# Patient Record
Sex: FEMALE | Race: ASIAN | Employment: OTHER | URBAN - METROPOLITAN AREA
[De-identification: names, ages, dates, MRNs, and addresses within clinical notes are randomized per-mention and may not be internally consistent; named-entity substitution may affect disease eponyms.]

---

## 2024-03-14 ENCOUNTER — OFFICE VISIT (OUTPATIENT)
Dept: OBGYN CLINIC | Facility: CLINIC | Age: 34
End: 2024-03-14
Payer: COMMERCIAL

## 2024-03-14 VITALS
BODY MASS INDEX: 22.39 KG/M2 | DIASTOLIC BLOOD PRESSURE: 60 MMHG | WEIGHT: 118.6 LBS | SYSTOLIC BLOOD PRESSURE: 100 MMHG | HEIGHT: 61 IN

## 2024-03-14 DIAGNOSIS — N92.1 BREAKTHROUGH BLEEDING ON OCPS: ICD-10-CM

## 2024-03-14 DIAGNOSIS — Z01.419 ENCOUNTER FOR GYNECOLOGICAL EXAMINATION WITHOUT ABNORMAL FINDING: Primary | ICD-10-CM

## 2024-03-14 PROCEDURE — G0145 SCR C/V CYTO,THINLAYER,RESCR: HCPCS | Performed by: OBSTETRICS & GYNECOLOGY

## 2024-03-14 PROCEDURE — S0610 ANNUAL GYNECOLOGICAL EXAMINA: HCPCS | Performed by: OBSTETRICS & GYNECOLOGY

## 2024-03-14 PROCEDURE — G0476 HPV COMBO ASSAY CA SCREEN: HCPCS | Performed by: OBSTETRICS & GYNECOLOGY

## 2024-03-14 RX ORDER — NORGESTIMATE AND ETHINYL ESTRADIOL 0.25-0.035
1 KIT ORAL DAILY
Qty: 28 TABLET | Refills: 3 | Status: SHIPPED | OUTPATIENT
Start: 2024-03-14

## 2024-03-14 NOTE — PROGRESS NOTES
"Subjective      Abby Hansen is a 33 y.o. female who presents for annual well woman exam. Periods are irregular, lasting 10 days. No intermenstrual bleeding, spotting, or discharge.  When she was on OCP was c/o constant spotting   Current contraception: OCP (estrogen/progesterone)  History of abnormal Pap smear: no  Family history of uterine or ovarian cancer: no  Family history of breast cancer: no    Menstrual History:  OB History          0    Para   0    Term   0       0    AB   0    Living   0         SAB   0    IAB   0    Ectopic   0    Multiple   0    Live Births   0                  Patient's last menstrual period was 2024 (exact date).  Period Pattern: (!) Irregular (Pt states she was having bleeding with bc pills.)  Menstrual Flow: Light  Dysmenorrhea: (!) Severe  Dysmenorrhea Symptoms: Other (Comment) (back pain.)    The following portions of the patient's history were reviewed and updated as appropriate: allergies, current medications, past family history, past medical history, past social history, past surgical history, and problem list.    Review of Systems  Review of Systems   Constitutional:  Negative for activity change, appetite change, chills, fatigue and fever.   Respiratory:  Negative for apnea, cough, chest tightness and shortness of breath.    Cardiovascular:  Negative for chest pain, palpitations and leg swelling.   Gastrointestinal:  Negative for abdominal pain, constipation, diarrhea, nausea and vomiting.   Genitourinary:  Negative for difficulty urinating, dysuria, flank pain, frequency, hematuria and urgency.   Neurological:  Negative for dizziness, seizures, syncope, light-headedness, numbness and headaches.   Psychiatric/Behavioral:  Negative for agitation and confusion.           Objective      /60 (BP Location: Left arm, Patient Position: Sitting, Cuff Size: Adult)   Ht 5' 1\" (1.549 m)   Wt 53.8 kg (118 lb 9.6 oz)   LMP 2024 (Exact Date)   BMI 22.41 " kg/m²     Physical Exam  OBGyn Exam     General:   alert and oriented, in no acute distress, alert, appears stated age, and cooperative   Heart: regular rate and rhythm, S1, S2 normal, no murmur, click, rub or gallop   Lungs: clear to auscultation bilaterally   Abdomen: soft, non-tender, without masses or organomegaly   Vulva: normal   Vagina: normal mucosa, normal discharge   Cervix: no cervical motion tenderness and no lesions   Uterus: normal size   Adnexa:  Breast Exam:  normal adnexa  breasts appear normal, no suspicious masses, no skin or nipple changes or axillary nodes.                Assessment      @well woman@ .  33-year-old female  Annual exam  Irregular menses  Was having breakthrough bleeding on OCP  Desire to try different type of OCP  Plan   Pap/HPV  Diet/exercise  Calcium/vitamin D  Pelvic ultrasound evaluate for any abnormality  Return to office in 3 months follow-up on OCP bleeding and discussed ultrasound result   All questions answered.     There are no Patient Instructions on file for this visit.

## 2024-03-15 LAB
HPV HR 12 DNA CVX QL NAA+PROBE: NEGATIVE
HPV16 DNA CVX QL NAA+PROBE: NEGATIVE
HPV18 DNA CVX QL NAA+PROBE: NEGATIVE

## 2024-03-22 ENCOUNTER — HOSPITAL ENCOUNTER (OUTPATIENT)
Dept: ULTRASOUND IMAGING | Facility: HOSPITAL | Age: 34
End: 2024-03-22
Attending: OBSTETRICS & GYNECOLOGY
Payer: COMMERCIAL

## 2024-03-22 DIAGNOSIS — N92.1 BREAKTHROUGH BLEEDING ON OCPS: ICD-10-CM

## 2024-03-22 LAB
LAB AP GYN PRIMARY INTERPRETATION: NORMAL
Lab: NORMAL

## 2024-03-22 PROCEDURE — 76856 US EXAM PELVIC COMPLETE: CPT

## 2024-03-22 PROCEDURE — 76830 TRANSVAGINAL US NON-OB: CPT

## 2024-05-22 ENCOUNTER — TELEPHONE (OUTPATIENT)
Dept: OBGYN CLINIC | Facility: CLINIC | Age: 34
End: 2024-05-22

## 2024-05-22 NOTE — TELEPHONE ENCOUNTER
Pt states she was told to start OCP when her period comes, However she has not had her period. Pt states she went to her PCP and had pregnancy test done and it was negative. Pt would like to know what do you recommend. Pt would for us to contact her mother in law Lacey which is on her chart as her emergency contact.

## 2024-05-22 NOTE — TELEPHONE ENCOUNTER
Please let the patient do urine pregnancy test if negative she can start taking the birth control pills every day at the same time she can do quick start she may have breakthrough bleeding for the first month recommend to monitor the bleeding for the next couple of months after the first month and to keep her appointment in 3 months for follow-up

## 2024-05-22 NOTE — TELEPHONE ENCOUNTER
Left detailed message for patient per communication consent reviewing directions per Dr. Joseph. Offered call back with questions or concerns.

## 2024-06-05 ENCOUNTER — NURSE TRIAGE (OUTPATIENT)
Age: 34
End: 2024-06-05

## 2024-06-19 ENCOUNTER — OFFICE VISIT (OUTPATIENT)
Dept: OBGYN CLINIC | Facility: CLINIC | Age: 34
End: 2024-06-19
Payer: COMMERCIAL

## 2024-06-19 VITALS
BODY MASS INDEX: 21.98 KG/M2 | DIASTOLIC BLOOD PRESSURE: 62 MMHG | SYSTOLIC BLOOD PRESSURE: 100 MMHG | HEIGHT: 61 IN | WEIGHT: 116.4 LBS

## 2024-06-19 DIAGNOSIS — N93.9 ABNORMAL UTERINE BLEEDING: Primary | ICD-10-CM

## 2024-06-19 PROCEDURE — 99213 OFFICE O/P EST LOW 20 MIN: CPT | Performed by: OBSTETRICS & GYNECOLOGY

## 2024-06-19 RX ORDER — MULTIVITAMIN
1 CAPSULE ORAL DAILY
COMMUNITY

## 2024-06-19 NOTE — PROGRESS NOTES
"Assessment/Plan:     There are no diagnoses linked to this encounter.      33-year-old female  Irregular menses  Was having breakthrough bleeding on OCP  Labs suggest unovulation  Started higher dose OCP 2 weeks ago  Plan   Pelvic ultrasound result reviewed discussed with patient  Lab result reviewed and discussed with patient  Continue OCP for 3 months  Monitor bleeding and symptoms for the next 3 months  Return to office in 3 months for follow-up        Subjective:      Patient ID: Abby Hansen is a 34 y.o. female.    HPI  Patient seen evaluated present to the office today with her partner follow-up on abnormal uterine bleeding  Patient was having breakthrough bleeding with low-dose OCP patient switch to higher dose OCP patient went for lab work and ultrasound both result reviewed and discussed with patient  Patient started higher dose OCP Sprintec 2 weeks ago  Patient did not have withdrawal bleeding when she stopped prior OCP  Lab results suggest un ovulation status secondary to LH> FSH  Finding reviewed and discussed with patient recommendation to continue current OCP for 3 months monitor bleeding and symptom and to return to office in 3 months   Denies any nausea vomiting  Denies any bloating  Denies any breast tenderness  Denies any side effects on OCP  The following portions of the patient's history were reviewed and updated as appropriate: allergies, current medications, past family history, past medical history, past social history, past surgical history and problem list.    Review of Systems      Objective:      /62 (BP Location: Left arm, Patient Position: Sitting, Cuff Size: Adult)   Ht 5' 1\" (1.549 m)   Wt 52.8 kg (116 lb 6.4 oz)   BMI 21.99 kg/m²          Physical Exam  Constitutional:       Appearance: Normal appearance.   Neurological:      General: No focal deficit present.      Mental Status: She is alert and oriented to person, place, and time.   Psychiatric:         Mood and Affect: Mood " normal.         Behavior: Behavior normal.

## 2024-09-21 DIAGNOSIS — N92.1 BREAKTHROUGH BLEEDING ON OCPS: ICD-10-CM

## 2024-09-23 RX ORDER — NORGESTIMATE AND ETHINYL ESTRADIOL 0.25-0.035
1 KIT ORAL DAILY
Qty: 28 TABLET | Refills: 3 | Status: SHIPPED | OUTPATIENT
Start: 2024-09-23

## 2024-11-21 ENCOUNTER — OFFICE VISIT (OUTPATIENT)
Dept: OBGYN CLINIC | Facility: CLINIC | Age: 34
End: 2024-11-21
Payer: COMMERCIAL

## 2024-11-21 VITALS
WEIGHT: 120.2 LBS | HEIGHT: 61 IN | DIASTOLIC BLOOD PRESSURE: 64 MMHG | BODY MASS INDEX: 22.69 KG/M2 | SYSTOLIC BLOOD PRESSURE: 102 MMHG

## 2024-11-21 DIAGNOSIS — N92.1 BREAKTHROUGH BLEEDING ON OCPS: ICD-10-CM

## 2024-11-21 PROCEDURE — 99213 OFFICE O/P EST LOW 20 MIN: CPT | Performed by: OBSTETRICS & GYNECOLOGY

## 2024-11-21 RX ORDER — NIRMATRELVIR AND RITONAVIR 300-100 MG
KIT ORAL
COMMUNITY
Start: 2024-09-02

## 2024-11-21 RX ORDER — NORGESTIMATE AND ETHINYL ESTRADIOL 0.25-0.035
1 KIT ORAL DAILY
Qty: 28 TABLET | Refills: 5 | Status: SHIPPED | OUTPATIENT
Start: 2024-11-21

## 2024-11-22 NOTE — PROGRESS NOTES
"Assessment/Plan:     Diagnoses and all orders for this visit:    Breakthrough bleeding on OCPs  -     norgestimate-ethinyl estradiol (Estarylla) 0.25-35 MG-MCG per tablet; Take 1 tablet by mouth daily    Other orders  -     Paxlovid, 300/100, tablet therapy pack;  (Patient not taking: Reported on 11/21/2024)        34-year-old female  Irregular menses  Was having breakthrough bleeding on OCP  Labs suggest unovulation  Started higher dose OCP desires to continue  Plan   Continue OCP risk benefit side effect explain   All questions answered and patient was satisfied       Subjective:      Patient ID: Abby Hansen is a 34 y.o. female.    HPI  33 yo female here today for follow up on OCP   Patient said her bleeding improved on OCP denies any breakthrough bleeding  Having regular cycle menstrual chart reviewed and discussed with patient  Risk-benefit side effect of OCP reviewed and discussed with patient  Denies any side effect  Denies any breakthrough bleeding  Denies any breast tenderness  Denies any weight changes  Denies any mood changes  Patient desired to continue risk-benefit side effect again discussed with patient all patient questions answered and patient was satisfied        The following portions of the patient's history were reviewed and updated as appropriate: allergies, current medications, past family history, past medical history, past social history, past surgical history and problem list.    Review of Systems      Objective:      /64 (BP Location: Left arm, Patient Position: Sitting, Cuff Size: Adult)   Ht 5' 1\" (1.549 m)   Wt 54.5 kg (120 lb 3.2 oz)   BMI 22.71 kg/m²          Physical Exam  Constitutional:       Appearance: Normal appearance.   Neurological:      General: No focal deficit present.      Mental Status: She is alert and oriented to person, place, and time.   Psychiatric:         Mood and Affect: Mood normal.         Behavior: Behavior normal.         "

## 2024-12-06 ENCOUNTER — TELEPHONE (OUTPATIENT)
Age: 34
End: 2024-12-06

## 2025-04-25 ENCOUNTER — OFFICE VISIT (OUTPATIENT)
Age: 35
End: 2025-04-25
Payer: COMMERCIAL

## 2025-04-25 VITALS
OXYGEN SATURATION: 100 % | DIASTOLIC BLOOD PRESSURE: 73 MMHG | BODY MASS INDEX: 23.22 KG/M2 | HEIGHT: 61 IN | SYSTOLIC BLOOD PRESSURE: 109 MMHG | HEART RATE: 72 BPM | WEIGHT: 123 LBS

## 2025-04-25 DIAGNOSIS — R93.5 ABNORMAL CT OF THE ABDOMEN: ICD-10-CM

## 2025-04-25 DIAGNOSIS — R19.06 EPIGASTRIC FULLNESS: ICD-10-CM

## 2025-04-25 DIAGNOSIS — R10.9 ABDOMINAL PAIN, UNSPECIFIED ABDOMINAL LOCATION: Primary | ICD-10-CM

## 2025-04-25 PROCEDURE — 99204 OFFICE O/P NEW MOD 45 MIN: CPT | Performed by: PHYSICIAN ASSISTANT

## 2025-04-25 RX ORDER — SODIUM CHLORIDE, SODIUM LACTATE, POTASSIUM CHLORIDE, CALCIUM CHLORIDE 600; 310; 30; 20 MG/100ML; MG/100ML; MG/100ML; MG/100ML
125 INJECTION, SOLUTION INTRAVENOUS CONTINUOUS
Status: CANCELLED | OUTPATIENT
Start: 2025-04-25

## 2025-04-25 RX ORDER — CHLORAL HYDRATE 500 MG
1000 CAPSULE ORAL DAILY
COMMUNITY

## 2025-04-25 RX ORDER — POLYETHYLENE GLYCOL-3350 AND ELECTROLYTES 236; 6.74; 5.86; 2.97; 22.74 G/274.31G; G/274.31G; G/274.31G; G/274.31G; G/274.31G
4 POWDER, FOR SOLUTION ORAL ONCE
Qty: 4000 ML | Refills: 0 | Status: SHIPPED | OUTPATIENT
Start: 2025-04-25 | End: 2025-05-02

## 2025-04-25 NOTE — PROGRESS NOTES
Name: Abby Hansen      : 1990      MRN: [id number]  Encounter Provider: Farhana Barrientos PA-C  Encounter Date: 2025   Encounter department: [facility]  :  Assessment & Plan  Abdominal pain, unspecified abdominal location  Patient with abdominal pain this was likely related to trapped gas and she has significant constipation on imaging, may have hypomotility of gut and would recommend to start on MiraLAX daily but may need different medication to help facilitate bowel movement such as Motegrity, will plan for colonoscopy to evaluate for any underlying lesion which is less likely  Orders:    Colonoscopy; Future    Abnormal CT of the abdomen  Likely findings consistent with gaseous distention along with fecal retention, colonoscopy planned for further evaluation, GoLytely prep ordered  Orders:    Colonoscopy; Future    polyethylene glycol (GaviLyte-G) 4000 mL solution; Take 4,000 mL by mouth once for 1 dose    Epigastric fullness  Patient does have epigastric fullness and feels like food is not digesting and indigestion symptoms and patient may have H. pylori infection in her stomach, will plan EGD at time of colonoscopy, could also have motility disorder if EGD findings are negative  Orders:    EGD; Future    Plan for EGD and colonoscopy, prep and procedure explained to patient and patient's  and mother-in-law    History of Present Illness   Abby Hansen is a 34 y.o. female who presents for evaluation of abdominal pain.  Patient was in [place] and had abdominal pain and had CAT scan which showed gaseous distention of large bowel most prominent in the rectum defect on adjacent stomach no bowel wall thickening.  Also was noted to have small fat-containing umbilical hernia, did have 20 liver lesion which was likely a cyst as it was too small to characterize.  Blood work and UA were unremarkable at that time.  Patient presents with her mother-in-law and ,  "patient is primarily [place]ian speaking but is able to communicate with her .  She has history of constipation and she has been taking fiber to help with this, she eats and states that she feels like food is sitting in her stomach.  She otherwise gets bloated and sometimes abdominal cramping.  Patient's  did have images on his laptop of the CAT scan which did show significant stool.  She otherwise does get indigestion symptoms according to her mother-in-law.  Patient has only been here for 3 years but did live in [place] and previously [facility].  Patient does not take laxatives on a regular basis.                  HPI    Review of Systems A complete review of systems is negative other than that noted above in the HPI.         Objective   /73 (BP Location: Left arm, Patient Position: Sitting, Cuff Size: Standard)   Pulse 72   Ht 5' 1\" (1.549 m)   Wt 55.8 kg (123 lb)   SpO2 100%   BMI 23.24 kg/m²     Physical Exam   General Alert no acute distress  Heart normal S1-S2  Lungs clear to auscultation  Abdomen soft positive bowel sounds some vague tenderness palpation of left upper quadrant with no rebound rigidity or guarding      Lab Results: I personally reviewed relevant lab results.             "

## 2025-04-25 NOTE — H&P (VIEW-ONLY)
Name: Abby Hansen      : 1990      MRN: [id number]  Encounter Provider: Farhana Barrientos PA-C  Encounter Date: 2025   Encounter department: [facility]  :  Assessment & Plan  Abdominal pain, unspecified abdominal location  Patient with abdominal pain this was likely related to trapped gas and she has significant constipation on imaging, may have hypomotility of gut and would recommend to start on MiraLAX daily but may need different medication to help facilitate bowel movement such as Motegrity, will plan for colonoscopy to evaluate for any underlying lesion which is less likely  Orders:    Colonoscopy; Future    Abnormal CT of the abdomen  Likely findings consistent with gaseous distention along with fecal retention, colonoscopy planned for further evaluation, GoLytely prep ordered  Orders:    Colonoscopy; Future    polyethylene glycol (GaviLyte-G) 4000 mL solution; Take 4,000 mL by mouth once for 1 dose    Epigastric fullness  Patient does have epigastric fullness and feels like food is not digesting and indigestion symptoms and patient may have H. pylori infection in her stomach, will plan EGD at time of colonoscopy, could also have motility disorder if EGD findings are negative  Orders:    EGD; Future    Plan for EGD and colonoscopy, prep and procedure explained to patient and patient's  and mother-in-law    History of Present Illness   Abby Hansen is a 34 y.o. female who presents for evaluation of abdominal pain.  Patient was in [place] and had abdominal pain and had CAT scan which showed gaseous distention of large bowel most prominent in the rectum defect on adjacent stomach no bowel wall thickening.  Also was noted to have small fat-containing umbilical hernia, did have 20 liver lesion which was likely a cyst as it was too small to characterize.  Blood work and UA were unremarkable at that time.  Patient presents with her mother-in-law and ,  "patient is primarily Malay speaking but is able to communicate with her .  She has history of constipation and she has been taking fiber to help with this, she eats and states that she feels like food is sitting in her stomach.  She otherwise gets bloated and sometimes abdominal cramping.  Patient's  did have images on his laptop of the CAT scan which did show significant stool.  She otherwise does get indigestion symptoms according to her mother-in-law.  Patient has only been here for 3 years but did live in [place] and previously [facility].  Patient does not take laxatives on a regular basis.                  HPI    Review of Systems A complete review of systems is negative other than that noted above in the HPI.         Objective   /73 (BP Location: Left arm, Patient Position: Sitting, Cuff Size: Standard)   Pulse 72   Ht 5' 1\" (1.549 m)   Wt 55.8 kg (123 lb)   SpO2 100%   BMI 23.24 kg/m²     Physical Exam   General Alert no acute distress  Heart normal S1-S2  Lungs clear to auscultation  Abdomen soft positive bowel sounds some vague tenderness palpation of left upper quadrant with no rebound rigidity or guarding      Lab Results: I personally reviewed relevant lab results.             "

## 2025-04-25 NOTE — PATIENT INSTRUCTIONS
Scheduled date of EGD/colonoscopy (as of today):05/02/25  Physician performing EGD/colonoscopy:Dr. Myers  Location of EGD/colonoscopy:[facility]  Desired bowel prep reviewed with patient:Eva  Instructions reviewed with patient by:rayn  Clearances:   n/a

## 2025-05-01 ENCOUNTER — TELEPHONE (OUTPATIENT)
Dept: GASTROENTEROLOGY | Facility: CLINIC | Age: 35
End: 2025-05-01

## 2025-05-02 ENCOUNTER — HOSPITAL ENCOUNTER (OUTPATIENT)
Dept: GASTROENTEROLOGY | Facility: AMBULARY SURGERY CENTER | Age: 35
Setting detail: OUTPATIENT SURGERY
End: 2025-05-02
Attending: PHYSICIAN ASSISTANT
Payer: COMMERCIAL

## 2025-05-02 ENCOUNTER — ANESTHESIA (OUTPATIENT)
Dept: GASTROENTEROLOGY | Facility: AMBULARY SURGERY CENTER | Age: 35
End: 2025-05-02
Payer: COMMERCIAL

## 2025-05-02 VITALS
WEIGHT: 123.02 LBS | RESPIRATION RATE: 18 BRPM | BODY MASS INDEX: 23.24 KG/M2 | OXYGEN SATURATION: 100 % | TEMPERATURE: 98.2 F | SYSTOLIC BLOOD PRESSURE: 104 MMHG | DIASTOLIC BLOOD PRESSURE: 59 MMHG | HEART RATE: 60 BPM

## 2025-05-02 DIAGNOSIS — R93.5 ABNORMAL CT OF THE ABDOMEN: ICD-10-CM

## 2025-05-02 DIAGNOSIS — R10.9 ABDOMINAL PAIN, UNSPECIFIED ABDOMINAL LOCATION: ICD-10-CM

## 2025-05-02 DIAGNOSIS — R19.06 EPIGASTRIC FULLNESS: ICD-10-CM

## 2025-05-02 PROCEDURE — 45378 DIAGNOSTIC COLONOSCOPY: CPT | Performed by: INTERNAL MEDICINE

## 2025-05-02 PROCEDURE — 43239 EGD BIOPSY SINGLE/MULTIPLE: CPT | Performed by: INTERNAL MEDICINE

## 2025-05-02 PROCEDURE — 88342 IMHCHEM/IMCYTCHM 1ST ANTB: CPT | Performed by: PATHOLOGY

## 2025-05-02 PROCEDURE — 88305 TISSUE EXAM BY PATHOLOGIST: CPT | Performed by: PATHOLOGY

## 2025-05-02 RX ORDER — SODIUM CHLORIDE, SODIUM LACTATE, POTASSIUM CHLORIDE, CALCIUM CHLORIDE 600; 310; 30; 20 MG/100ML; MG/100ML; MG/100ML; MG/100ML
INJECTION, SOLUTION INTRAVENOUS CONTINUOUS PRN
Status: DISCONTINUED | OUTPATIENT
Start: 2025-05-02 | End: 2025-05-02

## 2025-05-02 RX ORDER — SODIUM CHLORIDE, SODIUM LACTATE, POTASSIUM CHLORIDE, CALCIUM CHLORIDE 600; 310; 30; 20 MG/100ML; MG/100ML; MG/100ML; MG/100ML
125 INJECTION, SOLUTION INTRAVENOUS CONTINUOUS
Status: DISPENSED | OUTPATIENT
Start: 2025-05-02

## 2025-05-02 RX ORDER — PROPOFOL 10 MG/ML
INJECTION, EMULSION INTRAVENOUS AS NEEDED
Status: DISCONTINUED | OUTPATIENT
Start: 2025-05-02 | End: 2025-05-02

## 2025-05-02 RX ORDER — LIDOCAINE HYDROCHLORIDE 10 MG/ML
INJECTION, SOLUTION EPIDURAL; INFILTRATION; INTRACAUDAL; PERINEURAL AS NEEDED
Status: DISCONTINUED | OUTPATIENT
Start: 2025-05-02 | End: 2025-05-02

## 2025-05-02 RX ADMIN — SODIUM CHLORIDE, SODIUM LACTATE, POTASSIUM CHLORIDE, AND CALCIUM CHLORIDE: .6; .31; .03; .02 INJECTION, SOLUTION INTRAVENOUS at 10:24

## 2025-05-02 RX ADMIN — PROPOFOL 50 MG: 10 INJECTION, EMULSION INTRAVENOUS at 10:52

## 2025-05-02 RX ADMIN — PROPOFOL 100 MG: 10 INJECTION, EMULSION INTRAVENOUS at 10:57

## 2025-05-02 RX ADMIN — PROPOFOL 100 MG: 10 INJECTION, EMULSION INTRAVENOUS at 10:45

## 2025-05-02 RX ADMIN — PROPOFOL 50 MG: 10 INJECTION, EMULSION INTRAVENOUS at 10:48

## 2025-05-02 RX ADMIN — LIDOCAINE HYDROCHLORIDE 50 MG: 10 INJECTION, SOLUTION EPIDURAL; INFILTRATION; INTRACAUDAL; PERINEURAL at 10:45

## 2025-05-02 RX ADMIN — PROPOFOL 50 MG: 10 INJECTION, EMULSION INTRAVENOUS at 10:54

## 2025-05-02 NOTE — ANESTHESIA PREPROCEDURE EVALUATION
Procedure:  COLONOSCOPY  EGD    Relevant Problems   No relevant active problems        Physical Exam    Airway    Mallampati score: II  TM Distance: >3 FB  Neck ROM: full     Dental   No notable dental hx     Cardiovascular  Cardiovascular exam normal    Pulmonary  Pulmonary exam normal     Other Findings  post-pubertal.      Anesthesia Plan  ASA Score- 1     Anesthesia Type- IV sedation with anesthesia with ASA Monitors.         Additional Monitors:     Airway Plan:            Plan Factors-Exercise tolerance (METS): >4 METS.    Chart reviewed.   Existing labs reviewed. Patient summary reviewed.    Patient is not a current smoker.              Induction-     Postoperative Plan-     Perioperative Resuscitation Plan - Level 1 - Full Code.       Informed Consent- Anesthetic plan and risks discussed with patient.  I personally reviewed this patient with the CRNA. Discussed and agreed on the Anesthesia Plan with the CRNA..      NPO Status:  No vitals data found for the desired time range.

## 2025-05-02 NOTE — PERIOPERATIVE NURSING NOTE
Report given to Claudia SMITH. Stretcher locked in lowest position with side rails up x2 & call bell within reach.

## 2025-05-02 NOTE — INTERVAL H&P NOTE
H&P reviewed. After examining the patient I find no changes in the patients condition since the H&P had been written.    Vitals:    05/02/25 1014   BP: 109/59   Pulse:    Resp:    Temp:    SpO2:

## 2025-05-02 NOTE — ANESTHESIA POSTPROCEDURE EVALUATION
Post-Op Assessment Note    CV Status:  Stable  Pain Score: 0    Pain management: adequate       Mental Status:  Sleepy   Hydration Status:  Stable   PONV Controlled:  None   Airway Patency:  Patent     Post Op Vitals Reviewed: Yes    No anethesia notable event occurred.    Staff: CRNA           Last Filed PACU Vitals:  Vitals Value Taken Time   Temp     Pulse 86    BP 90/50    Resp 18    SpO2 100

## 2025-05-08 PROCEDURE — 88342 IMHCHEM/IMCYTCHM 1ST ANTB: CPT | Performed by: PATHOLOGY

## 2025-05-08 PROCEDURE — 88305 TISSUE EXAM BY PATHOLOGIST: CPT | Performed by: PATHOLOGY

## 2025-05-08 NOTE — TELEPHONE ENCOUNTER
Patients GI provider:  Dr. Myers    Number to return call: [phone]     Reason for call: Pt's mother in law called (pt gave verbal consent to speak with her, as no consent on file) because they saw the pt's results from her procedure on [facility] and would like to know if based on those results of pt needs a follow up appointment.    Scheduled procedure/appointment date if applicable: nothing at this time

## 2025-05-09 ENCOUNTER — RESULTS FOLLOW-UP (OUTPATIENT)
Age: 35
End: 2025-05-09

## 2025-05-09 NOTE — RESULT ENCOUNTER NOTE
Please notify the patient regarding results.  Stomach biopsies benign.  There is some mild irritation likely from medication, but no evidence of H. pylori or precancerous changes.

## 2025-05-14 ENCOUNTER — OFFICE VISIT (OUTPATIENT)
Dept: OBGYN CLINIC | Facility: CLINIC | Age: 35
End: 2025-05-14
Payer: COMMERCIAL

## 2025-05-14 VITALS
HEIGHT: 61 IN | SYSTOLIC BLOOD PRESSURE: 104 MMHG | DIASTOLIC BLOOD PRESSURE: 54 MMHG | WEIGHT: 114 LBS | BODY MASS INDEX: 21.52 KG/M2

## 2025-05-14 DIAGNOSIS — N92.1 BREAKTHROUGH BLEEDING ON OCPS: ICD-10-CM

## 2025-05-14 PROCEDURE — 99213 OFFICE O/P EST LOW 20 MIN: CPT | Performed by: OBSTETRICS & GYNECOLOGY

## 2025-05-14 RX ORDER — NORGESTIMATE AND ETHINYL ESTRADIOL 0.25-0.035
1 KIT ORAL DAILY
Qty: 84 TABLET | Refills: 3 | Status: SHIPPED | OUTPATIENT
Start: 2025-05-14

## 2025-05-14 NOTE — PROGRESS NOTES
Pre-charting for Appointment      Reason for being seen today: Follow up on BC    Any current testing/imaging done prior to exam today:

## 2025-05-15 NOTE — PROGRESS NOTES
"Assessment/Plan:     Diagnoses and all orders for this visit:    Breakthrough bleeding on OCPs  -     norgestimate-ethinyl estradiol (Estarylla) 0.25-35 MG-MCG per tablet; Take 1 tablet by mouth daily          35-year-old female  OCP for contraception and AUB  Labs suggest on ovulation  History of breakthrough bleeding with lower dose OCP  Plan  Patient is doing well on current OCP  Continue OCP risk-benefit side effect reviewed and discussed with patient  Return to office for annual exam    Subjective:      Patient ID: Abby Hansen is a 35 y.o. female.    HPI  Patient seen evaluated presented office today for follow-up on OCP  Patient was taking OCP for contraception patient was starting on lower dose and was complaining of breakthrough bleeding  Patient switched to higher dose estrogen  Patient started taking the higher dose oral contraceptive pills  Patient said she does not have any breakthrough bleeding anymore  Patient is having her menstrual regularly  Denies any dysmenorrhea  Denies any dyspareunia  Denies any pelvic pain  Patient is satisfied with OCP and desires to continue  Risk-benefit side effect reviewed and discussed with patient all patient questions answered and patient was satisfied    The following portions of the patient's history were reviewed and updated as appropriate: allergies, current medications, past family history, past medical history, past social history, past surgical history and problem list.    Review of Systems      Objective:      /54 (BP Location: Left arm, Patient Position: Sitting, Cuff Size: Adult)   Ht 5' 1\" (1.549 m)   Wt 51.7 kg (114 lb)   LMP 05/10/2025 (Approximate) Comment: Pregnancy test waiver signed by pt, anesthesiologist & proceduralist  BMI 21.54 kg/m²          Physical Exam  Constitutional:       Appearance: Normal appearance.     Neurological:      General: No focal deficit present.      Mental Status: She is alert and oriented to person, place, and " time.     Psychiatric:         Mood and Affect: Mood normal.         Behavior: Behavior normal.